# Patient Record
Sex: FEMALE | Race: BLACK OR AFRICAN AMERICAN | URBAN - METROPOLITAN AREA
[De-identification: names, ages, dates, MRNs, and addresses within clinical notes are randomized per-mention and may not be internally consistent; named-entity substitution may affect disease eponyms.]

---

## 2017-09-28 PROBLEM — 414916001 OBESITY: Status: ACTIVE | Noted: 2017-09-28

## 2020-10-02 ENCOUNTER — OFFICE VISIT (OUTPATIENT)
Dept: URBAN - METROPOLITAN AREA CLINIC 27 | Facility: CLINIC | Age: 65
End: 2020-10-02

## 2020-11-03 ENCOUNTER — TELEPHONE ENCOUNTER (OUTPATIENT)
Dept: URBAN - METROPOLITAN AREA CLINIC 96 | Facility: CLINIC | Age: 65
End: 2020-11-03

## 2020-11-03 ENCOUNTER — OFFICE VISIT (OUTPATIENT)
Dept: URBAN - METROPOLITAN AREA CLINIC 96 | Facility: CLINIC | Age: 65
End: 2020-11-03

## 2020-11-03 ENCOUNTER — OFFICE VISIT (OUTPATIENT)
Dept: URBAN - METROPOLITAN AREA TELEHEALTH 2 | Facility: TELEHEALTH | Age: 65
End: 2020-11-03
Payer: MEDICARE

## 2020-11-03 DIAGNOSIS — R10.13 DYSPEPSIA: ICD-10-CM

## 2020-11-03 DIAGNOSIS — K21.9 GERD (GASTROESOPHAGEAL REFLUX DISEASE): ICD-10-CM

## 2020-11-03 DIAGNOSIS — Z91.89 COLON CANCER HIGH RISK: ICD-10-CM

## 2020-11-03 DIAGNOSIS — K63.5 COLON POLYP: ICD-10-CM

## 2020-11-03 PROCEDURE — G8427 DOCREV CUR MEDS BY ELIG CLIN: HCPCS | Performed by: INTERNAL MEDICINE

## 2020-11-03 PROCEDURE — G9903 PT SCRN TBCO ID AS NON USER: HCPCS | Performed by: INTERNAL MEDICINE

## 2020-11-03 PROCEDURE — 1036F TOBACCO NON-USER: CPT | Performed by: INTERNAL MEDICINE

## 2020-11-03 PROCEDURE — 3017F COLORECTAL CA SCREEN DOC REV: CPT | Performed by: INTERNAL MEDICINE

## 2020-11-03 PROCEDURE — G8420 CALC BMI NORM PARAMETERS: HCPCS | Performed by: INTERNAL MEDICINE

## 2020-11-03 PROCEDURE — G9622 NO UNHEAL ETOH USER: HCPCS | Performed by: INTERNAL MEDICINE

## 2020-11-03 PROCEDURE — 99204 OFFICE O/P NEW MOD 45 MIN: CPT | Performed by: INTERNAL MEDICINE

## 2020-11-03 PROCEDURE — G8482 FLU IMMUNIZE ORDER/ADMIN: HCPCS | Performed by: INTERNAL MEDICINE

## 2020-11-03 RX ORDER — SODIUM, POTASSIUM,MAG SULFATES 17.5-3.13G
354ML SOLUTION, RECONSTITUTED, ORAL ORAL
Qty: 354 MILLILITER | Refills: 0 | OUTPATIENT
Start: 2020-11-04 | End: 2020-11-05

## 2020-11-03 RX ORDER — LABETALOL HYDROCHLORIDE 100 MG/1
1 TABLET TABLET ORAL TWICE A DAY
Status: ACTIVE | COMMUNITY

## 2020-11-03 NOTE — HPI-OTHER HISTORIES
Pt here for eval of refractory GERD. . Today on 11/3/2020, pt reports that she has been on Nexium 40mg for 5 years.  Her doctor did not tell her why she has to be on it.  When she attempted to stop/reduce the dose, she has flare of her sxs.  Does not drink much water.  She takes Celebrex daily.  No n/v/f/c.  No abd pain, diarrhea or chest pain.  History of colon polyps, last colonoscopy in 2015. . PMH: HTN, mild copd (not on O2), fibromyalgia SH: quit smoking 2015; no etoh FH: NO GI malignancy

## 2020-11-04 ENCOUNTER — LAB OUTSIDE AN ENCOUNTER (OUTPATIENT)
Dept: URBAN - METROPOLITAN AREA TELEHEALTH 2 | Facility: TELEHEALTH | Age: 65
End: 2020-11-04

## 2020-11-13 ENCOUNTER — OFFICE VISIT (OUTPATIENT)
Dept: URBAN - METROPOLITAN AREA SURGERY CENTER 7 | Facility: SURGERY CENTER | Age: 65
End: 2020-11-13

## 2020-11-17 ENCOUNTER — WEB ENCOUNTER (OUTPATIENT)
Dept: URBAN - METROPOLITAN AREA CLINIC 96 | Facility: CLINIC | Age: 65
End: 2020-11-17

## 2020-11-17 ENCOUNTER — OFFICE VISIT (OUTPATIENT)
Dept: URBAN - METROPOLITAN AREA CLINIC 96 | Facility: CLINIC | Age: 65
End: 2020-11-17
Payer: MEDICARE

## 2020-11-17 DIAGNOSIS — K21.9 GERD (GASTROESOPHAGEAL REFLUX DISEASE): ICD-10-CM

## 2020-11-17 DIAGNOSIS — K63.5 COLON POLYP: ICD-10-CM

## 2020-11-17 DIAGNOSIS — R10.13 DYSPEPSIA: ICD-10-CM

## 2020-11-17 DIAGNOSIS — Z91.89 COLON CANCER HIGH RISK: ICD-10-CM

## 2020-11-17 DIAGNOSIS — R07.89 ATYPICAL CHEST PAIN: ICD-10-CM

## 2020-11-17 PROCEDURE — G8417 CALC BMI ABV UP PARAM F/U: HCPCS | Performed by: INTERNAL MEDICINE

## 2020-11-17 PROCEDURE — G8427 DOCREV CUR MEDS BY ELIG CLIN: HCPCS | Performed by: INTERNAL MEDICINE

## 2020-11-17 PROCEDURE — 3017F COLORECTAL CA SCREEN DOC REV: CPT | Performed by: INTERNAL MEDICINE

## 2020-11-17 PROCEDURE — G9903 PT SCRN TBCO ID AS NON USER: HCPCS | Performed by: INTERNAL MEDICINE

## 2020-11-17 PROCEDURE — G8482 FLU IMMUNIZE ORDER/ADMIN: HCPCS | Performed by: INTERNAL MEDICINE

## 2020-11-17 PROCEDURE — 99214 OFFICE O/P EST MOD 30 MIN: CPT | Performed by: INTERNAL MEDICINE

## 2020-11-17 PROCEDURE — 1036F TOBACCO NON-USER: CPT | Performed by: INTERNAL MEDICINE

## 2020-11-17 PROCEDURE — G9622 NO UNHEAL ETOH USER: HCPCS | Performed by: INTERNAL MEDICINE

## 2020-11-17 RX ORDER — LABETALOL HYDROCHLORIDE 100 MG/1
1 TABLET TABLET ORAL TWICE A DAY
Status: ACTIVE | COMMUNITY

## 2020-11-17 RX ORDER — SODIUM, POTASSIUM,MAG SULFATES 17.5-3.13G
354 ML SOLUTION, RECONSTITUTED, ORAL ORAL
Qty: 354 ML | Refills: 0 | OUTPATIENT
Start: 2020-11-17 | End: 2020-11-18

## 2020-11-17 NOTE — HPI-OTHER HISTORIES
Pt here for eval of refractory GERD. . Previously on 11/3/2020, pt reports that she has been on Nexium 40mg for 5 years.  Her doctor did not tell her why she has to be on it.  When she attempted to stop/reduce the dose, she has flare of her sxs.  Does not drink much water.  She takes Celebrex daily.  No n/v/f/c.  No abd pain, diarrhea or chest pain.  History of colon polyps, last colonoscopy in 2015. . Today on 11/17/2020, pt reports that  her GERD is well controlled on the Nexium; drinks plenty of water.  No NSAID.  Occasaionl chest pain likely reflux. . PMH: HTN, mild copd (not on O2), fibromyalgia; h/o PUD with bleeding in 2015 SH: quit smoking 2015; no etoh FH: NO GI malignancy .

## 2020-11-17 NOTE — PHYSICAL EXAM CONSTITUTIONAL:
well developed, well nourished , in no acute distress , ambulating without difficulty , normal communication ability 2

## 2020-12-17 ENCOUNTER — OFFICE VISIT (OUTPATIENT)
Dept: URBAN - METROPOLITAN AREA SURGERY CENTER 18 | Facility: SURGERY CENTER | Age: 65
End: 2020-12-17

## 2021-02-24 ENCOUNTER — LAB OUTSIDE AN ENCOUNTER (OUTPATIENT)
Dept: URBAN - METROPOLITAN AREA CLINIC 98 | Facility: CLINIC | Age: 66
End: 2021-02-24

## 2021-02-24 ENCOUNTER — OFFICE VISIT (OUTPATIENT)
Dept: URBAN - METROPOLITAN AREA CLINIC 98 | Facility: CLINIC | Age: 66
End: 2021-02-24
Payer: MEDICARE

## 2021-02-24 VITALS
DIASTOLIC BLOOD PRESSURE: 63 MMHG | TEMPERATURE: 96.3 F | WEIGHT: 230.2 LBS | SYSTOLIC BLOOD PRESSURE: 143 MMHG | BODY MASS INDEX: 39.3 KG/M2 | HEART RATE: 53 BPM | HEIGHT: 64 IN

## 2021-02-24 DIAGNOSIS — K21.9 GERD (GASTROESOPHAGEAL REFLUX DISEASE): ICD-10-CM

## 2021-02-24 DIAGNOSIS — R10.13 DYSPEPSIA: ICD-10-CM

## 2021-02-24 DIAGNOSIS — I49.1 ATRIAL PREMATURE DEPOLARIZATION: ICD-10-CM

## 2021-02-24 DIAGNOSIS — K63.5 COLON POLYP: ICD-10-CM

## 2021-02-24 DIAGNOSIS — R07.89 ATYPICAL CHEST PAIN: ICD-10-CM

## 2021-02-24 DIAGNOSIS — Z91.89 COLON CANCER HIGH RISK: ICD-10-CM

## 2021-02-24 PROCEDURE — 99214 OFFICE O/P EST MOD 30 MIN: CPT | Performed by: INTERNAL MEDICINE

## 2021-02-24 RX ORDER — ESOMEPRAZOLE MAGNESIUM 40 MG/1
1 CAPSULE CAPSULE, DELAYED RELEASE ORAL ONCE A DAY
Qty: 30 CAPSULE | Refills: 11 | OUTPATIENT
Start: 2021-02-24

## 2021-02-24 RX ORDER — LABETALOL HYDROCHLORIDE 100 MG/1
1 TABLET TABLET ORAL TWICE A DAY
Status: ACTIVE | COMMUNITY

## 2021-02-24 NOTE — HPI-OTHER HISTORIES
Pt here for eval of refractory GERD. . Previously on 11/3/2020, pt reports that she has been on Nexium 40mg for 5 years.  Her doctor did not tell her why she has to be on it.  When she attempted to stop/reduce the dose, she has flare of her sxs.  Does not drink much water.  She takes Celebrex daily.  No n/v/f/c.  No abd pain, diarrhea or chest pain.  History of colon polyps, last colonoscopy in 2015. . Previously on 11/17/2020, pt reports that  her GERD is well controlled on the Nexium; drinks plenty of water.  No NSAID.  Occasaionl chest pain likely reflux. . Today on 2/24/2021, pt reports that she was hospitalized for afib recently, likely will need ablation.  Is on Elquus.  Reflux is doing well on Nexium.  PMH: HTN, mild copd (not on O2), fibromyalgia; h/o PUD with bleeding in 2015 SH: quit smoking 2015; no etoh FH: NO GI malignancy .

## 2021-03-05 ENCOUNTER — TELEPHONE ENCOUNTER (OUTPATIENT)
Dept: URBAN - METROPOLITAN AREA CLINIC 92 | Facility: CLINIC | Age: 66
End: 2021-03-05

## 2021-03-11 ENCOUNTER — TELEPHONE ENCOUNTER (OUTPATIENT)
Dept: URBAN - METROPOLITAN AREA CLINIC 92 | Facility: CLINIC | Age: 66
End: 2021-03-11

## 2021-03-18 ENCOUNTER — OFFICE VISIT (OUTPATIENT)
Dept: URBAN - METROPOLITAN AREA SURGERY CENTER 18 | Facility: SURGERY CENTER | Age: 66
End: 2021-03-18

## 2021-03-18 ENCOUNTER — TELEPHONE ENCOUNTER (OUTPATIENT)
Dept: URBAN - METROPOLITAN AREA CLINIC 98 | Facility: CLINIC | Age: 66
End: 2021-03-18

## 2021-03-24 ENCOUNTER — OFFICE VISIT (OUTPATIENT)
Dept: URBAN - METROPOLITAN AREA CLINIC 98 | Facility: CLINIC | Age: 66
End: 2021-03-24

## 2021-03-24 ENCOUNTER — OUT OF OFFICE VISIT (OUTPATIENT)
Dept: URBAN - METROPOLITAN AREA MEDICAL CENTER 28 | Facility: MEDICAL CENTER | Age: 66
End: 2021-03-24
Payer: MEDICARE

## 2021-03-24 ENCOUNTER — OFFICE VISIT (OUTPATIENT)
Dept: URBAN - METROPOLITAN AREA MEDICAL CENTER 28 | Facility: MEDICAL CENTER | Age: 66
End: 2021-03-24

## 2021-03-24 DIAGNOSIS — K29.60 ADENOPAPILLOMATOSIS GASTRICA: ICD-10-CM

## 2021-03-24 DIAGNOSIS — Z86.010 H/O ADENOMATOUS POLYP OF COLON: ICD-10-CM

## 2021-03-24 DIAGNOSIS — R12 BURNING REFLUX: ICD-10-CM

## 2021-03-24 DIAGNOSIS — K63.89 BACTERIAL OVERGROWTH SYNDROME: ICD-10-CM

## 2021-03-24 DIAGNOSIS — D12.5 ADENOMA OF SIGMOID COLON: ICD-10-CM

## 2021-03-24 PROCEDURE — 45380 COLONOSCOPY AND BIOPSY: CPT | Performed by: INTERNAL MEDICINE

## 2021-03-24 PROCEDURE — 43239 EGD BIOPSY SINGLE/MULTIPLE: CPT | Performed by: INTERNAL MEDICINE

## 2021-03-24 RX ORDER — LABETALOL HYDROCHLORIDE 100 MG/1
1 TABLET TABLET ORAL TWICE A DAY
Status: ACTIVE | COMMUNITY

## 2021-03-24 RX ORDER — ESOMEPRAZOLE MAGNESIUM 40 MG/1
1 CAPSULE CAPSULE, DELAYED RELEASE ORAL ONCE A DAY
Qty: 30 CAPSULE | Refills: 11 | Status: ACTIVE | COMMUNITY
Start: 2021-02-24

## 2021-04-14 ENCOUNTER — OFFICE VISIT (OUTPATIENT)
Dept: URBAN - METROPOLITAN AREA CLINIC 98 | Facility: CLINIC | Age: 66
End: 2021-04-14

## 2021-04-14 RX ORDER — ESOMEPRAZOLE MAGNESIUM 40 MG/1
1 CAPSULE CAPSULE, DELAYED RELEASE ORAL ONCE A DAY
Qty: 30 CAPSULE | Refills: 11 | Status: ACTIVE | COMMUNITY
Start: 2021-02-24

## 2021-04-14 RX ORDER — LABETALOL HYDROCHLORIDE 100 MG/1
1 TABLET TABLET ORAL TWICE A DAY
Status: ACTIVE | COMMUNITY

## 2021-04-14 NOTE — HPI-OTHER HISTORIES
Pt here for eval of refractory GERD. . Previously on 11/3/2020, pt reports that she has been on Nexium 40mg for 5 years.  Her doctor did not tell her why she has to be on it.  When she attempted to stop/reduce the dose, she has flare of her sxs.  Does not drink much water.  She takes Celebrex daily.  No n/v/f/c.  No abd pain, diarrhea or chest pain.  History of colon polyps, last colonoscopy in 2015. . Previously on 11/17/2020, pt reports that  her GERD is well controlled on the Nexium; drinks plenty of water.  No NSAID.  Occasaionl chest pain likely reflux. . Today on 2/24/2021, pt reports that she was hospitalized for afib recently, likely will need ablation.  Is on Elquus.  Reflux is doing well on Nexium.  PMH: HTN, mild copd (not on O2), fibromyalgia; h/o PUD with bleeding in 2015 SH: quit smoking 2015; no etoh FH: NO GI malignancy .  3/2021: Gastritis on EGD; biopsy showed: Gastric mucosa with inactive gastritis and regenerative changes; HP negative.  Colon polyp in transverse serrated adenoma; sigmoid: tubular adenoma; surveillance due in 3-5 years

## 2021-04-16 ENCOUNTER — OFFICE VISIT (OUTPATIENT)
Dept: URBAN - METROPOLITAN AREA MEDICAL CENTER 28 | Facility: MEDICAL CENTER | Age: 66
End: 2021-04-16

## 2021-05-05 ENCOUNTER — OFFICE VISIT (OUTPATIENT)
Dept: URBAN - METROPOLITAN AREA CLINIC 98 | Facility: CLINIC | Age: 66
End: 2021-05-05
Payer: MEDICARE

## 2021-05-05 VITALS
WEIGHT: 232.2 LBS | TEMPERATURE: 96.9 F | DIASTOLIC BLOOD PRESSURE: 76 MMHG | HEIGHT: 64 IN | HEART RATE: 47 BPM | SYSTOLIC BLOOD PRESSURE: 127 MMHG | BODY MASS INDEX: 39.64 KG/M2

## 2021-05-05 DIAGNOSIS — K63.5 COLON POLYP: ICD-10-CM

## 2021-05-05 DIAGNOSIS — K21.9 GERD (GASTROESOPHAGEAL REFLUX DISEASE): ICD-10-CM

## 2021-05-05 DIAGNOSIS — R07.89 ATYPICAL CHEST PAIN: ICD-10-CM

## 2021-05-05 DIAGNOSIS — I49.1 ATRIAL PREMATURE DEPOLARIZATION: ICD-10-CM

## 2021-05-05 DIAGNOSIS — Z91.89 COLON CANCER HIGH RISK: ICD-10-CM

## 2021-05-05 DIAGNOSIS — R10.13 DYSPEPSIA: ICD-10-CM

## 2021-05-05 PROCEDURE — 99214 OFFICE O/P EST MOD 30 MIN: CPT | Performed by: INTERNAL MEDICINE

## 2021-05-05 RX ORDER — ESOMEPRAZOLE MAGNESIUM 40 MG/1
1 CAPSULE CAPSULE, DELAYED RELEASE ORAL ONCE A DAY
Qty: 90 CAPSULE | Refills: 4 | OUTPATIENT

## 2021-05-05 RX ORDER — LABETALOL HYDROCHLORIDE 100 MG/1
1 TABLET TABLET ORAL TWICE A DAY
Status: ACTIVE | COMMUNITY

## 2021-05-05 RX ORDER — ESOMEPRAZOLE MAGNESIUM 40 MG/1
1 CAPSULE CAPSULE, DELAYED RELEASE ORAL ONCE A DAY
Qty: 30 CAPSULE | Refills: 11 | Status: ACTIVE | COMMUNITY
Start: 2021-02-24
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
## 2021-10-20 ENCOUNTER — OFFICE VISIT (OUTPATIENT)
Dept: URBAN - METROPOLITAN AREA CLINIC 98 | Facility: CLINIC | Age: 66
End: 2021-10-20
Payer: MEDICARE

## 2021-10-20 DIAGNOSIS — K63.5 COLON POLYP: ICD-10-CM

## 2021-10-20 DIAGNOSIS — R10.13 DYSPEPSIA: ICD-10-CM

## 2021-10-20 DIAGNOSIS — Z91.89 COLON CANCER HIGH RISK: ICD-10-CM

## 2021-10-20 DIAGNOSIS — I49.1 ATRIAL PREMATURE DEPOLARIZATION: ICD-10-CM

## 2021-10-20 DIAGNOSIS — R07.89 ATYPICAL CHEST PAIN: ICD-10-CM

## 2021-10-20 DIAGNOSIS — K21.9 GERD (GASTROESOPHAGEAL REFLUX DISEASE): ICD-10-CM

## 2021-10-20 PROCEDURE — 997 AG2 (NON BILLABLE): Performed by: INTERNAL MEDICINE

## 2021-10-20 PROCEDURE — 993 AGA: Performed by: INTERNAL MEDICINE

## 2021-10-20 PROCEDURE — 992 APS NON BILLABLE: Performed by: INTERNAL MEDICINE

## 2021-10-20 RX ORDER — ESOMEPRAZOLE MAGNESIUM 40 MG/1
1 CAPSULE CAPSULE, DELAYED RELEASE ORAL ONCE A DAY
Qty: 90 CAPSULE | Refills: 4 | Status: ACTIVE | COMMUNITY

## 2021-10-20 RX ORDER — LABETALOL HYDROCHLORIDE 100 MG/1
1 TABLET TABLET ORAL TWICE A DAY
Status: ACTIVE | COMMUNITY

## 2021-10-20 RX ORDER — ESOMEPRAZOLE MAGNESIUM 40 MG/1
1 CAPSULE CAPSULE, DELAYED RELEASE ORAL ONCE A DAY
Qty: 90 CAPSULE | Refills: 4 | OUTPATIENT

## 2021-10-20 NOTE — HPI-TODAY'S VISIT:
NO SHOW . Pt here for eval of refractory GERD. . Previously on 11/3/2020, pt reports that she has been on Nexium 40mg for 5 years.  Her doctor did not tell her why she has to be on it.  When she attempted to stop/reduce the dose, she has flare of her sxs.  Does not drink much water.  She takes Celebrex daily.  No n/v/f/c.  No abd pain, diarrhea or chest pain.  History of colon polyps, last colonoscopy in 2015. . Previously on 11/17/2020, pt reports that  her GERD is well controlled on the Nexium; drinks plenty of water.  No NSAID.  Occasaionl chest pain likely reflux. . Previously on 2/24/2021, pt reports that she was hospitalized for afib recently, likely will need ablation.  Is on Elquus.  Reflux is doing well on Nexium. . Today on 5/5/2021, pt reports that she has a pain in rectum since the colonoscopy.  SIgnificant spams present.  PMH: HTN, mild copd (not on O2), fibromyalgia; h/o PUD with bleeding in 2015 SH: quit smoking 2015; no etoh FH: NO GI malignancy .  3/2021: (A) STOMACH, BIOPSY: - GASTRIC MUCOSA WITH MILD CHRONIC INACTIVE GASTRITIS AND REACTIVE/REGENERATIVE CHANGES. - NO HELICOBACTER-LIKE ORGANISMS IDENTIFIED ON [facility] STAIN. (B) TRANSVERSE COLON, POLYP, POLYPECTOMY: - POLYPOID FRAGMENT OF COLONIC MUCOSA WITH SERRATED FEATURES AND FEATURES SUGGESTIVE OF HYPERPLASTIC POLYP. (C) SIGMOID COLON, POLYP, POLYPECTOMY: - TUBULAR ADENOMA. .

## 2021-12-29 ENCOUNTER — OFFICE VISIT (OUTPATIENT)
Dept: URBAN - METROPOLITAN AREA CLINIC 98 | Facility: CLINIC | Age: 66
End: 2021-12-29
Payer: MEDICARE

## 2021-12-29 DIAGNOSIS — K21.9 GERD (GASTROESOPHAGEAL REFLUX DISEASE): ICD-10-CM

## 2021-12-29 DIAGNOSIS — Z91.89 COLON CANCER HIGH RISK: ICD-10-CM

## 2021-12-29 DIAGNOSIS — R10.13 DYSPEPSIA: ICD-10-CM

## 2021-12-29 DIAGNOSIS — K63.5 COLON POLYP: ICD-10-CM

## 2021-12-29 DIAGNOSIS — I49.1 ATRIAL PREMATURE DEPOLARIZATION: ICD-10-CM

## 2021-12-29 PROCEDURE — 99214 OFFICE O/P EST MOD 30 MIN: CPT | Performed by: INTERNAL MEDICINE

## 2021-12-29 RX ORDER — FAMOTIDINE 40 MG/1
1 TAB TABLET, FILM COATED ORAL ONCE A DAY
Qty: 30 TABLET | Refills: 11 | OUTPATIENT
Start: 2021-12-29

## 2021-12-29 RX ORDER — ESOMEPRAZOLE MAGNESIUM 40 MG/1
1 CAPSULE CAPSULE, DELAYED RELEASE ORAL ONCE A DAY
Qty: 90 CAPSULE | Refills: 4 | OUTPATIENT

## 2021-12-29 RX ORDER — LABETALOL HYDROCHLORIDE 100 MG/1
1 TABLET TABLET ORAL TWICE A DAY
Status: ACTIVE | COMMUNITY

## 2021-12-29 RX ORDER — ESOMEPRAZOLE MAGNESIUM 40 MG/1
1 CAPSULE CAPSULE, DELAYED RELEASE ORAL ONCE A DAY
Qty: 90 CAPSULE | Refills: 4 | Status: ACTIVE | COMMUNITY
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
## 2022-04-30 ENCOUNTER — TELEPHONE ENCOUNTER (OUTPATIENT)
Dept: URBAN - METROPOLITAN AREA CLINIC 121 | Facility: CLINIC | Age: 67
End: 2022-04-30

## 2022-04-30 RX ORDER — ESOMEPRAZOLE MAGNESIUM 40 MG
1 CAPSULE PO BID CAPSULE,DELAYED RELEASE (ENTERIC COATED) ORAL
OUTPATIENT
Start: 2015-08-18

## 2022-04-30 RX ORDER — MV,CALCIUM,MIN/IRON/FOLIC ACID 18-0.4-6MG
TABLET ORAL
OUTPATIENT
Start: 2015-08-18

## 2022-04-30 RX ORDER — CITALOPRAM 40 MG/1
TABLET ORAL
OUTPATIENT
Start: 2015-04-02

## 2022-04-30 RX ORDER — BUPROPION HYDROCHLORIDE 200 MG/1
TABLET, FILM COATED ORAL
OUTPATIENT
Start: 2015-04-02

## 2022-04-30 RX ORDER — CITALOPRAM 40 MG/1
TABLET ORAL
OUTPATIENT
Start: 2015-04-02 | End: 2015-08-18

## 2022-05-01 ENCOUNTER — TELEPHONE ENCOUNTER (OUTPATIENT)
Dept: URBAN - METROPOLITAN AREA CLINIC 121 | Facility: CLINIC | Age: 67
End: 2022-05-01

## 2022-05-01 RX ORDER — MONTELUKAST SODIUM 10 MG/1
TABLET, FILM COATED ORAL
Status: ACTIVE | COMMUNITY
Start: 2020-10-02

## 2022-05-01 RX ORDER — OMEPRAZOLE 40 MG/1
TAKE 1 CAPSULE PO BID CAPSULE, DELAYED RELEASE ORAL
Status: ACTIVE | COMMUNITY
Start: 2018-10-11

## 2022-05-01 RX ORDER — ESOMEPRAZOLE MAGNESIUM 40 MG
TAKE 1 CAPSULE BY MOUTH TWICE A DAY CAPSULE,DELAYED RELEASE (ENTERIC COATED) ORAL
Status: ACTIVE | COMMUNITY
Start: 2020-01-15

## 2022-05-01 RX ORDER — TIZANIDINE HYDROCHLORIDE 4 MG/1
TABLET ORAL
Status: ACTIVE | COMMUNITY
Start: 2017-09-28

## 2022-05-01 RX ORDER — BUPROPION HYDROCHLORIDE 200 MG/1
ON HOLD TABLET, FILM COATED ORAL
Status: ACTIVE | COMMUNITY
Start: 2015-04-02

## 2022-05-01 RX ORDER — FLUTICASONE PROPIONATE 50 UG/1
SPRAY, METERED NASAL
Status: ACTIVE | COMMUNITY
Start: 2020-10-02

## 2022-05-01 RX ORDER — BISMUTH SUBSALICYLATE 525 MG/1
1 CAPSULE PO QD TABLET ORAL
Status: ACTIVE | COMMUNITY
Start: 2017-09-28

## 2022-05-01 RX ORDER — MV,CALCIUM,MIN/IRON/FOLIC ACID 18-0.4-6MG
TABLET ORAL
Status: ACTIVE | COMMUNITY
Start: 2015-08-18

## 2022-05-01 RX ORDER — LOSARTAN POTASSIUM 100 MG/1
TABLET, FILM COATED ORAL
Status: ACTIVE | COMMUNITY
Start: 2020-10-02

## 2022-06-08 ENCOUNTER — OFFICE VISIT (OUTPATIENT)
Dept: URBAN - METROPOLITAN AREA CLINIC 96 | Facility: CLINIC | Age: 67
End: 2022-06-08

## 2022-06-08 RX ORDER — MV,CALCIUM,MIN/IRON/FOLIC ACID 18-0.4-6MG
TABLET ORAL
Status: ACTIVE | COMMUNITY
Start: 2015-08-18

## 2022-06-08 RX ORDER — MONTELUKAST SODIUM 10 MG/1
TABLET, FILM COATED ORAL
Status: ACTIVE | COMMUNITY
Start: 2020-10-02

## 2022-06-08 RX ORDER — LOSARTAN POTASSIUM 100 MG/1
TABLET, FILM COATED ORAL
Status: ACTIVE | COMMUNITY
Start: 2020-10-02

## 2022-06-08 RX ORDER — LABETALOL HYDROCHLORIDE 100 MG/1
1 TABLET TABLET ORAL TWICE A DAY
Status: ACTIVE | COMMUNITY

## 2022-06-08 RX ORDER — TIZANIDINE HYDROCHLORIDE 4 MG/1
TABLET ORAL
Status: ACTIVE | COMMUNITY
Start: 2017-09-28

## 2022-06-08 RX ORDER — ESOMEPRAZOLE MAGNESIUM 40 MG
TAKE 1 CAPSULE BY MOUTH TWICE A DAY CAPSULE,DELAYED RELEASE (ENTERIC COATED) ORAL
Status: ACTIVE | COMMUNITY
Start: 2020-01-15

## 2022-06-08 RX ORDER — OMEPRAZOLE 40 MG/1
TAKE 1 CAPSULE PO BID CAPSULE, DELAYED RELEASE ORAL
Status: ACTIVE | COMMUNITY
Start: 2018-10-11

## 2022-06-08 RX ORDER — BUPROPION HYDROCHLORIDE 200 MG/1
ON HOLD TABLET, FILM COATED ORAL
Status: ACTIVE | COMMUNITY
Start: 2015-04-02

## 2022-06-08 RX ORDER — FAMOTIDINE 40 MG/1
1 TAB TABLET, FILM COATED ORAL ONCE A DAY
Qty: 30 TABLET | Refills: 11 | Status: ACTIVE | COMMUNITY
Start: 2021-12-29

## 2022-06-08 RX ORDER — BISMUTH SUBSALICYLATE 525 MG/1
1 CAPSULE PO QD TABLET ORAL
Status: ACTIVE | COMMUNITY
Start: 2017-09-28

## 2022-06-08 RX ORDER — FLUTICASONE PROPIONATE 50 UG/1
SPRAY, METERED NASAL
Status: ACTIVE | COMMUNITY
Start: 2020-10-02

## 2022-06-08 RX ORDER — ESOMEPRAZOLE MAGNESIUM 40 MG/1
1 CAPSULE CAPSULE, DELAYED RELEASE ORAL ONCE A DAY
Qty: 90 CAPSULE | Refills: 4 | Status: ACTIVE | COMMUNITY

## 2022-10-26 ENCOUNTER — OFFICE VISIT (OUTPATIENT)
Dept: URBAN - METROPOLITAN AREA CLINIC 96 | Facility: CLINIC | Age: 67
End: 2022-10-26

## 2022-11-03 ENCOUNTER — OFFICE VISIT (OUTPATIENT)
Dept: URBAN - METROPOLITAN AREA CLINIC 27 | Facility: CLINIC | Age: 67
End: 2022-11-03
Payer: MEDICARE

## 2022-11-03 ENCOUNTER — WEB ENCOUNTER (OUTPATIENT)
Dept: URBAN - METROPOLITAN AREA CLINIC 27 | Facility: CLINIC | Age: 67
End: 2022-11-03

## 2022-11-03 VITALS
BODY MASS INDEX: 37.56 KG/M2 | RESPIRATION RATE: 17 BRPM | HEIGHT: 64 IN | WEIGHT: 220 LBS | SYSTOLIC BLOOD PRESSURE: 134 MMHG | HEART RATE: 66 BPM | DIASTOLIC BLOOD PRESSURE: 70 MMHG

## 2022-11-03 DIAGNOSIS — K92.1 MELENA: ICD-10-CM

## 2022-11-03 DIAGNOSIS — E66.01 MORBID (SEVERE) OBESITY DUE TO EXCESS CALORIES: ICD-10-CM

## 2022-11-03 DIAGNOSIS — R93.5 ABNORMAL ABDOMINAL CT SCAN: ICD-10-CM

## 2022-11-03 DIAGNOSIS — R10.13 DYSPEPSIA: ICD-10-CM

## 2022-11-03 PROCEDURE — 99244 OFF/OP CNSLTJ NEW/EST MOD 40: CPT | Performed by: INTERNAL MEDICINE

## 2022-11-03 PROCEDURE — 99204 OFFICE O/P NEW MOD 45 MIN: CPT | Performed by: INTERNAL MEDICINE

## 2022-11-03 RX ORDER — ESOMEPRAZOLE MAGNESIUM 40 MG/1
1 CAPSULE CAPSULE, DELAYED RELEASE ORAL ONCE A DAY
Qty: 90 CAPSULE | Refills: 4 | Status: ACTIVE | COMMUNITY

## 2022-11-03 RX ORDER — MV,CALCIUM,MIN/IRON/FOLIC ACID 18-0.4-6MG
TABLET ORAL
Status: ACTIVE | COMMUNITY
Start: 2015-08-18

## 2022-11-03 RX ORDER — FLUTICASONE PROPIONATE 50 UG/1
SPRAY, METERED NASAL
Status: ACTIVE | COMMUNITY
Start: 2020-10-02

## 2022-11-03 RX ORDER — TIZANIDINE HYDROCHLORIDE 4 MG/1
TABLET ORAL
Status: ACTIVE | COMMUNITY
Start: 2017-09-28

## 2022-11-03 RX ORDER — LABETALOL HYDROCHLORIDE 100 MG/1
1 TABLET TABLET ORAL TWICE A DAY
Status: ACTIVE | COMMUNITY

## 2022-11-03 RX ORDER — ESOMEPRAZOLE MAGNESIUM 40 MG
TAKE 1 CAPSULE BY MOUTH TWICE A DAY CAPSULE,DELAYED RELEASE (ENTERIC COATED) ORAL
Status: ACTIVE | COMMUNITY
Start: 2020-01-15

## 2022-11-03 RX ORDER — BISMUTH SUBSALICYLATE 525 MG/1
1 CAPSULE PO QD TABLET ORAL
Status: ACTIVE | COMMUNITY
Start: 2017-09-28

## 2022-11-03 RX ORDER — MONTELUKAST SODIUM 10 MG/1
TABLET, FILM COATED ORAL
Status: ACTIVE | COMMUNITY
Start: 2020-10-02

## 2022-11-03 RX ORDER — LOSARTAN POTASSIUM 100 MG/1
TABLET, FILM COATED ORAL
Status: ACTIVE | COMMUNITY
Start: 2020-10-02

## 2022-11-03 RX ORDER — BUPROPION HYDROCHLORIDE 200 MG/1
ON HOLD TABLET, FILM COATED ORAL
Status: ACTIVE | COMMUNITY
Start: 2015-04-02

## 2022-11-03 RX ORDER — FAMOTIDINE 40 MG/1
1 TAB TABLET, FILM COATED ORAL ONCE A DAY
Qty: 30 TABLET | Refills: 11 | Status: ACTIVE | COMMUNITY
Start: 2021-12-29

## 2022-11-03 RX ORDER — OMEPRAZOLE 40 MG/1
TAKE 1 CAPSULE PO BID CAPSULE, DELAYED RELEASE ORAL
Status: ACTIVE | COMMUNITY
Start: 2018-10-11

## 2022-11-03 NOTE — HPI-TODAY'S VISIT:
This is a 67-year-old female seen as a new patient in consultation today.  She was seen in the emergency room in September with COVID and abdominal pain.  She had a CT scan that showed a hiatal hernia and uterine fibroids.  She was having some black stool at the time but was taking Pepto-Bismol.  She was given Nexium and that helps a little bit.  She continues with intermittent abdominal symptoms and is concerned about the symptoms and the CT scan.  CT scan also raised the question of a nodule in the distal small bowel.  Given the history of melena at the time the question was raised as to whether this was a bleeding focus.  However she did not have any further overt bleeding.  She was seen by previous gastroenterologist and underwent a colonoscopy in March 2021 and a tubular adenoma was removed.  She is concerned because of continued intermittent abdominal symptoms

## 2022-12-14 ENCOUNTER — OFFICE VISIT (OUTPATIENT)
Dept: URBAN - METROPOLITAN AREA CLINIC 27 | Facility: CLINIC | Age: 67
End: 2022-12-14
Payer: MEDICARE

## 2022-12-14 ENCOUNTER — LAB OUTSIDE AN ENCOUNTER (OUTPATIENT)
Dept: URBAN - METROPOLITAN AREA CLINIC 27 | Facility: CLINIC | Age: 67
End: 2022-12-14

## 2022-12-14 VITALS
BODY MASS INDEX: 37.56 KG/M2 | WEIGHT: 220 LBS | HEIGHT: 64 IN | DIASTOLIC BLOOD PRESSURE: 69 MMHG | SYSTOLIC BLOOD PRESSURE: 151 MMHG | HEART RATE: 73 BPM | RESPIRATION RATE: 17 BRPM

## 2022-12-14 DIAGNOSIS — R93.5 ABNORMAL ABDOMINAL CT SCAN: ICD-10-CM

## 2022-12-14 DIAGNOSIS — R10.11 RIGHT UPPER QUADRANT ABDOMINAL PAIN: ICD-10-CM

## 2022-12-14 DIAGNOSIS — I49.1 ATRIAL PREMATURE DEPOLARIZATION: ICD-10-CM

## 2022-12-14 DIAGNOSIS — R10.13 DYSPEPSIA: ICD-10-CM

## 2022-12-14 DIAGNOSIS — E66.01 MORBID (SEVERE) OBESITY DUE TO EXCESS CALORIES: ICD-10-CM

## 2022-12-14 DIAGNOSIS — K21.9 GERD (GASTROESOPHAGEAL REFLUX DISEASE): ICD-10-CM

## 2022-12-14 DIAGNOSIS — K92.1 MELENA: ICD-10-CM

## 2022-12-14 PROCEDURE — 99214 OFFICE O/P EST MOD 30 MIN: CPT | Performed by: PHYSICIAN ASSISTANT

## 2022-12-14 RX ORDER — ESOMEPRAZOLE MAGNESIUM 40 MG/1
1 CAPSULE CAPSULE, DELAYED RELEASE ORAL ONCE A DAY
Qty: 90 CAPSULE | Refills: 4 | Status: ACTIVE | COMMUNITY

## 2022-12-14 RX ORDER — MV,CALCIUM,MIN/IRON/FOLIC ACID 18-0.4-6MG
TABLET ORAL
Status: ACTIVE | COMMUNITY
Start: 2015-08-18

## 2022-12-14 RX ORDER — TIZANIDINE HYDROCHLORIDE 4 MG/1
TABLET ORAL
Status: ACTIVE | COMMUNITY
Start: 2017-09-28

## 2022-12-14 RX ORDER — OMEPRAZOLE 40 MG/1
TAKE 1 CAPSULE PO BID CAPSULE, DELAYED RELEASE ORAL
Status: ACTIVE | COMMUNITY
Start: 2018-10-11

## 2022-12-14 RX ORDER — LOSARTAN POTASSIUM 100 MG/1
TABLET, FILM COATED ORAL
Status: ACTIVE | COMMUNITY
Start: 2020-10-02

## 2022-12-14 RX ORDER — FLUTICASONE PROPIONATE 50 UG/1
SPRAY, METERED NASAL
Status: ACTIVE | COMMUNITY
Start: 2020-10-02

## 2022-12-14 RX ORDER — BUPROPION HYDROCHLORIDE 200 MG/1
ON HOLD TABLET, FILM COATED ORAL
Status: ACTIVE | COMMUNITY
Start: 2015-04-02

## 2022-12-14 RX ORDER — LABETALOL HYDROCHLORIDE 100 MG/1
1 TABLET TABLET ORAL TWICE A DAY
Status: ACTIVE | COMMUNITY

## 2022-12-14 RX ORDER — FAMOTIDINE 40 MG/1
1 TAB TABLET, FILM COATED ORAL ONCE A DAY
Qty: 30 TABLET | Refills: 11 | Status: ACTIVE | COMMUNITY
Start: 2021-12-29

## 2022-12-14 RX ORDER — ESOMEPRAZOLE MAGNESIUM 40 MG
TAKE 1 CAPSULE BY MOUTH TWICE A DAY CAPSULE,DELAYED RELEASE (ENTERIC COATED) ORAL
Status: ACTIVE | COMMUNITY
Start: 2020-01-15

## 2022-12-14 RX ORDER — BISMUTH SUBSALICYLATE 525 MG/1
1 CAPSULE PO QD TABLET ORAL
Status: ACTIVE | COMMUNITY
Start: 2017-09-28

## 2022-12-14 RX ORDER — MONTELUKAST SODIUM 10 MG/1
TABLET, FILM COATED ORAL
Status: ACTIVE | COMMUNITY
Start: 2020-10-02

## 2022-12-14 NOTE — HPI-TODAY'S VISIT:
Ms. Pinozn is a 67-year-old female patient of Dr. Erazo presenting for evaluation of abdominal pain. After visit last month she was scheduled for UGI/SBFT but cancelled. She went to [place] ED on 12/8/22 after waking up with sharp, R sided abdominal pain. Labs were unremarkable. Stone protocol CT showed large pedunculated uterine fibroid vs. ovarian lesion. She saw GYN yesterday and is scheduled for pelvic US tmrw. Her R sided abdominal pain has improved but not resolved. She also c/o "sour stomach," belching, intermittent epigastric discomfort. No N/V, heartburn, dysphagia. No overt triggers for her pain. She has resumed Nexium QD which is not helping; Pepto, Pepcid also not helpful. Bowels are regular, no melena or hematochezia. New meds include Vitamin D supplement started a few wks ago. No prior abdominal surgeries; she still has her GB. No regular NSAID use. . Colonoscopy March 2021, [facility]:3 small polyps; recall 3 to 5 years EGD March 2021, [facility]:Gastritis, biopsies unremarkable FH:Sister with colon polyps . 11/3/22: This is a 67-year-old female seen as a new patient in consultation today.  She was seen in the emergency room in September with COVID and abdominal pain.  She had a CT scan that showed a hiatal hernia and uterine fibroids.  She was having some black stool at the time but was taking Pepto-Bismol.  She was given Nexium and that helps a little bit.  She continues with intermittent abdominal symptoms and is concerned about the symptoms and the CT scan.  CT scan also raised the question of a nodule in the distal small bowel.  Given the history of melena at the time the question was raised as to whether this was a bleeding focus.  However she did not have any further overt bleeding.  She was seen by previous gastroenterologist and underwent a colonoscopy in March 2021 and a tubular adenoma was removed.  She is concerned because of continued intermittent abdominal symptoms

## 2022-12-22 ENCOUNTER — OFFICE VISIT (OUTPATIENT)
Dept: URBAN - METROPOLITAN AREA CLINIC 27 | Facility: CLINIC | Age: 67
End: 2022-12-22

## 2023-01-05 ENCOUNTER — TELEPHONE ENCOUNTER (OUTPATIENT)
Dept: URBAN - METROPOLITAN AREA CLINIC 27 | Facility: CLINIC | Age: 68
End: 2023-01-05

## 2023-01-11 ENCOUNTER — OFFICE VISIT (OUTPATIENT)
Dept: URBAN - METROPOLITAN AREA CLINIC 27 | Facility: CLINIC | Age: 68
End: 2023-01-11
Payer: MEDICARE

## 2023-01-11 VITALS
HEIGHT: 64 IN | HEART RATE: 72 BPM | DIASTOLIC BLOOD PRESSURE: 61 MMHG | WEIGHT: 220 LBS | BODY MASS INDEX: 37.56 KG/M2 | RESPIRATION RATE: 17 BRPM | SYSTOLIC BLOOD PRESSURE: 126 MMHG

## 2023-01-11 DIAGNOSIS — R10.13 DYSPEPSIA: ICD-10-CM

## 2023-01-11 DIAGNOSIS — K21.9 GERD (GASTROESOPHAGEAL REFLUX DISEASE): ICD-10-CM

## 2023-01-11 DIAGNOSIS — R10.11 RIGHT UPPER QUADRANT ABDOMINAL PAIN: ICD-10-CM

## 2023-01-11 DIAGNOSIS — K80.20 GALLSTONES: ICD-10-CM

## 2023-01-11 DIAGNOSIS — E66.01 MORBID (SEVERE) OBESITY DUE TO EXCESS CALORIES: ICD-10-CM

## 2023-01-11 PROBLEM — 238136002 MORBID OBESITY: Status: ACTIVE | Noted: 2020-10-02

## 2023-01-11 PROBLEM — 162031009 DYSPEPSIA: Status: ACTIVE | Noted: 2022-12-14

## 2023-01-11 PROCEDURE — 99214 OFFICE O/P EST MOD 30 MIN: CPT | Performed by: PHYSICIAN ASSISTANT

## 2023-01-11 RX ORDER — OMEPRAZOLE 40 MG/1
TAKE 1 CAPSULE PO BID CAPSULE, DELAYED RELEASE ORAL
Status: ACTIVE | COMMUNITY
Start: 2018-10-11

## 2023-01-11 RX ORDER — MV,CALCIUM,MIN/IRON/FOLIC ACID 18-0.4-6MG
TABLET ORAL
Status: ACTIVE | COMMUNITY
Start: 2015-08-18

## 2023-01-11 RX ORDER — ESOMEPRAZOLE MAGNESIUM 40 MG/1
1 CAPSULE CAPSULE, DELAYED RELEASE ORAL ONCE A DAY
Qty: 90 CAPSULE | Refills: 4 | Status: ACTIVE | COMMUNITY

## 2023-01-11 RX ORDER — ESOMEPRAZOLE MAGNESIUM 40 MG
TAKE 1 CAPSULE BY MOUTH TWICE A DAY CAPSULE,DELAYED RELEASE (ENTERIC COATED) ORAL
Status: ACTIVE | COMMUNITY
Start: 2020-01-15

## 2023-01-11 RX ORDER — LABETALOL HYDROCHLORIDE 100 MG/1
1 TABLET TABLET ORAL TWICE A DAY
Status: ACTIVE | COMMUNITY

## 2023-01-11 RX ORDER — MONTELUKAST SODIUM 10 MG/1
TABLET, FILM COATED ORAL
Status: ACTIVE | COMMUNITY
Start: 2020-10-02

## 2023-01-11 RX ORDER — BISMUTH SUBSALICYLATE 525 MG/1
1 CAPSULE PO QD TABLET ORAL
Status: ACTIVE | COMMUNITY
Start: 2017-09-28

## 2023-01-11 RX ORDER — LOSARTAN POTASSIUM 100 MG/1
TABLET, FILM COATED ORAL
Status: ACTIVE | COMMUNITY
Start: 2020-10-02

## 2023-01-11 RX ORDER — TIZANIDINE HYDROCHLORIDE 4 MG/1
TABLET ORAL
Status: ACTIVE | COMMUNITY
Start: 2017-09-28

## 2023-01-11 RX ORDER — FLUTICASONE PROPIONATE 50 UG/1
SPRAY, METERED NASAL
Status: ACTIVE | COMMUNITY
Start: 2020-10-02

## 2023-01-11 RX ORDER — FAMOTIDINE 40 MG/1
1 TAB TABLET, FILM COATED ORAL ONCE A DAY
Qty: 30 TABLET | Refills: 11 | Status: ACTIVE | COMMUNITY
Start: 2021-12-29

## 2023-01-11 RX ORDER — BUPROPION HYDROCHLORIDE 200 MG/1
ON HOLD TABLET, FILM COATED ORAL
Status: ACTIVE | COMMUNITY
Start: 2015-04-02

## 2023-01-11 NOTE — HPI-TODAY'S VISIT:
1/11/23 Ms. Pinzon is a 67 YOF presenting for follow up of indigestion and abdominal discomfort. She feels well, has no more indigestions after she decreased the fiber in her diet. She has rare episodes of RUQ discomfort, not bothersome now. Her US showed numerous gallstones/sludge with thickened GB wall. She has had no N/V, change in bowels. She is seeing GYN for her abnormal pelvic US, says she may need biopsy. . Abd US 12/15/22: numerous gallstones and/or sludge, thickened GB wall (5mm); consider HIDA Pelvis US 12/15/22: thickened, heterogeneous endometrium, consider sampling; ?exophytic L fibroid . 12/14/22: Ms. Pinzon is a 67-year-old female patient of Dr. Erazo presenting for evaluation of abdominal pain. After visit last month she was scheduled for UGI/SBFT but cancelled. She went to [place] ED on 12/8/22 after waking up with sharp, R sided abdominal pain. Labs were unremarkable. Stone protocol CT showed large pedunculated uterine fibroid vs. ovarian lesion. She saw GYN yesterday and is scheduled for pelvic US tmrw. Her R sided abdominal pain has improved but not resolved. She also c/o "sour stomach," belching, intermittent epigastric discomfort. No N/V, heartburn, dysphagia. No overt triggers for her pain. She has resumed Nexium QD which is not helping; Pepto, Pepcid also not helpful. Bowels are regular, no melena or hematochezia. New meds include Vitamin D supplement started a few wks ago. No prior abdominal surgeries; she still has her GB. No regular NSAID use. . 11/3/22: This is a 67-year-old female seen as a new patient in consultation today.  She was seen in the emergency room in September with COVID and abdominal pain.  She had a CT scan that showed a hiatal hernia and uterine fibroids.  She was having some black stool at the time but was taking Pepto-Bismol.  She was given Nexium and that helps a little bit.  She continues with intermittent abdominal symptoms and is concerned about the symptoms and the CT scan.  CT scan also raised the question of a nodule in the distal small bowel.  Given the history of melena at the time the question was raised as to whether this was a bleeding focus.  However she did not have any further overt bleeding.  She was seen by previous gastroenterologist and underwent a colonoscopy in March 2021 and a tubular adenoma was removed.  She is concerned because of continued intermittent abdominal symptoms . Colonoscopy March 2021, [facility]:3 small polyps; recall 3 to 5 years EGD March 2021, [facility]:Gastritis, biopsies unremarkable FH:Sister with colon polyps

## 2023-01-11 NOTE — PHYSICAL EXAM GASTROINTESTINAL
Abdomen , soft, markedly TTP in lateral RUQ, nondistended , no guarding or rigidity , no masses palpable , normal bowel sounds , Liver and Spleen , no hepatomegaly present , no hepatosplenomegaly , liver nontender , spleen not palpable

## 2023-01-18 ENCOUNTER — OFFICE VISIT (OUTPATIENT)
Dept: URBAN - METROPOLITAN AREA CLINIC 27 | Facility: CLINIC | Age: 68
End: 2023-01-18

## 2023-09-11 ENCOUNTER — TELEPHONE ENCOUNTER (OUTPATIENT)
Dept: URBAN - METROPOLITAN AREA CLINIC 27 | Facility: CLINIC | Age: 68
End: 2023-09-11

## 2023-09-11 RX ORDER — ESOMEPRAZOLE MAGNESIUM 40 MG/1
1 CAPSULE CAPSULE, DELAYED RELEASE ORAL ONCE A DAY
Qty: 90 CAPSULE | Refills: 4

## 2023-09-13 ENCOUNTER — OFFICE VISIT (OUTPATIENT)
Dept: URBAN - METROPOLITAN AREA CLINIC 27 | Facility: CLINIC | Age: 68
End: 2023-09-13

## 2023-09-19 ENCOUNTER — OFFICE VISIT (OUTPATIENT)
Dept: URBAN - METROPOLITAN AREA CLINIC 27 | Facility: CLINIC | Age: 68
End: 2023-09-19

## 2023-09-19 RX ORDER — TIZANIDINE HYDROCHLORIDE 4 MG/1
TABLET ORAL
Status: ACTIVE | COMMUNITY
Start: 2017-09-28

## 2023-09-19 RX ORDER — LOSARTAN POTASSIUM 100 MG/1
TABLET, FILM COATED ORAL
Status: ACTIVE | COMMUNITY
Start: 2020-10-02

## 2023-09-19 RX ORDER — ESOMEPRAZOLE MAGNESIUM 40 MG/1
1 CAPSULE CAPSULE, DELAYED RELEASE ORAL ONCE A DAY
Qty: 90 CAPSULE | Refills: 4 | Status: ACTIVE | COMMUNITY

## 2023-09-19 RX ORDER — LABETALOL HYDROCHLORIDE 100 MG/1
1 TABLET TABLET ORAL TWICE A DAY
Status: ACTIVE | COMMUNITY

## 2023-09-19 RX ORDER — BISMUTH SUBSALICYLATE 525 MG/1
1 CAPSULE PO QD TABLET ORAL
Status: ACTIVE | COMMUNITY
Start: 2017-09-28

## 2023-09-19 RX ORDER — MV,CALCIUM,MIN/IRON/FOLIC ACID 18-0.4-6MG
TABLET ORAL
Status: ACTIVE | COMMUNITY
Start: 2015-08-18

## 2023-09-19 RX ORDER — ESOMEPRAZOLE MAGNESIUM 40 MG
TAKE 1 CAPSULE BY MOUTH TWICE A DAY CAPSULE,DELAYED RELEASE (ENTERIC COATED) ORAL
Status: ACTIVE | COMMUNITY
Start: 2020-01-15

## 2023-09-19 RX ORDER — FAMOTIDINE 40 MG/1
1 TAB TABLET, FILM COATED ORAL ONCE A DAY
Qty: 30 TABLET | Refills: 11 | Status: ACTIVE | COMMUNITY
Start: 2021-12-29

## 2023-09-19 RX ORDER — OMEPRAZOLE 40 MG/1
TAKE 1 CAPSULE PO BID CAPSULE, DELAYED RELEASE ORAL
Status: ACTIVE | COMMUNITY
Start: 2018-10-11

## 2023-09-19 RX ORDER — BUPROPION HYDROCHLORIDE 200 MG/1
ON HOLD TABLET, FILM COATED ORAL
Status: ACTIVE | COMMUNITY
Start: 2015-04-02

## 2023-09-19 RX ORDER — MONTELUKAST SODIUM 10 MG/1
TABLET, FILM COATED ORAL
Status: ACTIVE | COMMUNITY
Start: 2020-10-02

## 2023-09-19 RX ORDER — FLUTICASONE PROPIONATE 50 UG/1
SPRAY, METERED NASAL
Status: ACTIVE | COMMUNITY
Start: 2020-10-02

## 2023-09-19 NOTE — HPI-ZZZTODAY'S VISIT
Ms. Piznon is a 68-year-old female patient of Dr. Erazo presenting for follow-up of abdominal pain.  She has been seen several times over the last year with this issue.  At last visit in January she was feeling better after she had decreased dietary fiber.  She was only having rare episodes of RUQ discomfort, no nausea or vomiting.  She had seen GYN for her abnormal pelvic US.  She was referred to general surgery for consideration of cholecystectomy. . EGD March 2021, [facility]:Gastritis, biopsies unremarkable Colonoscopy March 2021, [facility]:3 small polyps; recall 3 to 5 years Abdominal US December 2022:Numerous gallstones and/or sludge, thickened gallbladder wall (5 mm); consider HIDA Pelvic US December 2022:Thickened heterogeneous endometrium, consider sampling; possible exophytic left fibroid

## 2024-01-29 ENCOUNTER — OFFICE VISIT (OUTPATIENT)
Dept: URBAN - METROPOLITAN AREA CLINIC 27 | Facility: CLINIC | Age: 69
End: 2024-01-29
Payer: MEDICARE

## 2024-01-29 VITALS
WEIGHT: 220 LBS | DIASTOLIC BLOOD PRESSURE: 58 MMHG | HEART RATE: 72 BPM | SYSTOLIC BLOOD PRESSURE: 138 MMHG | BODY MASS INDEX: 37.56 KG/M2 | HEIGHT: 64 IN | RESPIRATION RATE: 17 BRPM

## 2024-01-29 DIAGNOSIS — Z86.010 PERSONAL HISTORY OF COLONIC POLYPS: ICD-10-CM

## 2024-01-29 DIAGNOSIS — K21.9 ACID REFLUX: ICD-10-CM

## 2024-01-29 DIAGNOSIS — Z79.82 LONG TERM (CURRENT) USE OF ASPIRIN: ICD-10-CM

## 2024-01-29 DIAGNOSIS — K80.20 GALLSTONES: ICD-10-CM

## 2024-01-29 PROCEDURE — 99214 OFFICE O/P EST MOD 30 MIN: CPT | Performed by: PHYSICIAN ASSISTANT

## 2024-01-29 RX ORDER — ESOMEPRAZOLE MAGNESIUM 40 MG/1
1 CAPSULE CAPSULE, DELAYED RELEASE ORAL ONCE A DAY
Qty: 90 CAPSULE | Refills: 4 | Status: ACTIVE | COMMUNITY

## 2024-01-29 RX ORDER — FAMOTIDINE 40 MG/1
1 TAB TABLET, FILM COATED ORAL ONCE A DAY
Qty: 30 TABLET | Refills: 11 | Status: ACTIVE | COMMUNITY
Start: 2021-12-29

## 2024-01-29 RX ORDER — BISMUTH SUBSALICYLATE 525 MG/1
1 CAPSULE PO QD TABLET ORAL
Status: ACTIVE | COMMUNITY
Start: 2017-09-28

## 2024-01-29 RX ORDER — BUPROPION HYDROCHLORIDE 200 MG/1
ON HOLD TABLET, FILM COATED ORAL
Status: ACTIVE | COMMUNITY
Start: 2015-04-02

## 2024-01-29 RX ORDER — MV,CALCIUM,MIN/IRON/FOLIC ACID 18-0.4-6MG
TABLET ORAL
Status: ACTIVE | COMMUNITY
Start: 2015-08-18

## 2024-01-29 RX ORDER — TIZANIDINE HYDROCHLORIDE 4 MG/1
TABLET ORAL
Status: ACTIVE | COMMUNITY
Start: 2017-09-28

## 2024-01-29 RX ORDER — FLUTICASONE PROPIONATE 50 UG/1
SPRAY, METERED NASAL
Status: ACTIVE | COMMUNITY
Start: 2020-10-02

## 2024-01-29 RX ORDER — LABETALOL HYDROCHLORIDE 100 MG/1
1 TABLET TABLET ORAL TWICE A DAY
Status: ACTIVE | COMMUNITY

## 2024-01-29 RX ORDER — OMEPRAZOLE 40 MG/1
TAKE 1 CAPSULE PO BID CAPSULE, DELAYED RELEASE ORAL
Status: ACTIVE | COMMUNITY
Start: 2018-10-11

## 2024-01-29 RX ORDER — MONTELUKAST SODIUM 10 MG/1
TABLET, FILM COATED ORAL
Status: ACTIVE | COMMUNITY
Start: 2020-10-02

## 2024-01-29 RX ORDER — ESOMEPRAZOLE MAGNESIUM 40 MG
TAKE 1 CAPSULE BY MOUTH TWICE A DAY CAPSULE,DELAYED RELEASE (ENTERIC COATED) ORAL
Status: ACTIVE | COMMUNITY
Start: 2020-01-15

## 2024-01-29 RX ORDER — LOSARTAN POTASSIUM 100 MG/1
TABLET, FILM COATED ORAL
Status: ACTIVE | COMMUNITY
Start: 2020-10-02

## 2024-01-29 NOTE — HPI-ZZZTODAY'S VISIT
Ms. Pinzon is a 68-year-old female patient of Dr. Erazo presenting for follow-up of reflux and abdominal pain.  She has had no further episodes of RUQ pain. Abd US showed gallstones/sludge and thickened GB wall; she was referred to general surgery, saw them and was told she did not need jak unless she became symptomatic again. Recently she has been having nighttime coughing. She had laryngoscopy with Dr. Burrell which showed moderate reflux changes and was advised to follow up with Dr. Erazo. She denies overt heartburn, water brash, dysphagia. She was taking Nexium QD but has been cutting back, is concerned about long term PPI use. She also uses nighttime Pepcid prn. She does not think either of these meds help with her coughing. . EGD March 2021, [facility]: Gastritis, biopsies unremarkable Colonoscopy March 2021, [facility]: 3 small polyps; recall 3 to 5 years Abdominal US December 2022:Numerous gallstones and/or sludge, thickened gallbladder wall (5 mm); consider HIDA Pelvic US December 2022:Thickened heterogeneous endometrium, consider sampling; possible exophytic left fibroid a

## 2024-01-29 NOTE — PHYSICAL EXAM GASTROINTESTINAL
Abdomen , soft, epigastric and RUQ TTP, nondistended , no guarding or rigidity , no masses palpable , normal bowel sounds , Liver and Spleen , no hepatomegaly present , no hepatosplenomegaly , liver nontender , spleen not palpable

## 2024-01-30 ENCOUNTER — DASHBOARD ENCOUNTERS (OUTPATIENT)
Age: 69
End: 2024-01-30

## 2024-01-30 PROBLEM — 428283002: Status: ACTIVE | Noted: 2024-01-30

## 2024-01-30 PROBLEM — 235919008: Status: ACTIVE | Noted: 2023-01-11

## 2024-01-30 PROBLEM — 131531000119103: Status: ACTIVE | Noted: 2024-01-30

## 2024-01-30 PROBLEM — 235595009 GASTROESOPHAGEAL REFLUX DISEASE: Status: ACTIVE | Noted: 2020-11-04

## 2024-03-19 ENCOUNTER — OTHER (OUTPATIENT)
Dept: URBAN - METROPOLITAN AREA MEDICAL CENTER 8 | Facility: MEDICAL CENTER | Age: 69
End: 2024-03-19

## 2024-04-05 ENCOUNTER — OV EP (OUTPATIENT)
Dept: URBAN - METROPOLITAN AREA CLINIC 27 | Facility: CLINIC | Age: 69
End: 2024-04-05

## 2024-06-11 ENCOUNTER — TELEPHONE ENCOUNTER (OUTPATIENT)
Dept: URBAN - METROPOLITAN AREA CLINIC 13 | Facility: CLINIC | Age: 69
End: 2024-06-11

## 2024-06-14 ENCOUNTER — CLAIMS CREATED FROM THE CLAIM WINDOW (OUTPATIENT)
Dept: URBAN - METROPOLITAN AREA CLINIC 4 | Facility: CLINIC | Age: 69
End: 2024-06-14
Payer: MEDICARE

## 2024-06-14 ENCOUNTER — OUT OF OFFICE VISIT (OUTPATIENT)
Dept: URBAN - METROPOLITAN AREA SURGERY CENTER 7 | Facility: SURGERY CENTER | Age: 69
End: 2024-06-14
Payer: MEDICARE

## 2024-06-14 DIAGNOSIS — D12.5 ADENOMATOUS POLYP OF SIGMOID COLON: ICD-10-CM

## 2024-06-14 DIAGNOSIS — Z86.010 ADENOMAS PERSONAL HISTORY OF COLONIC POLYPS: ICD-10-CM

## 2024-06-14 DIAGNOSIS — Z12.11 COLON CANCER SCREENING (HIGH RISK): ICD-10-CM

## 2024-06-14 DIAGNOSIS — K63.5 POLYP OF COLON: ICD-10-CM

## 2024-06-14 DIAGNOSIS — Z86.010 PERSONAL HISTORY OF COLON POLYPS: ICD-10-CM

## 2024-06-14 DIAGNOSIS — K63.5 BENIGN COLON POLYP: ICD-10-CM

## 2024-06-14 DIAGNOSIS — K57.30 DIVERTICULA OF COLON: ICD-10-CM

## 2024-06-14 PROCEDURE — 00811 ANES LWR INTST NDSC NOS: CPT | Performed by: NURSE ANESTHETIST, CERTIFIED REGISTERED

## 2024-06-14 PROCEDURE — 45380 COLONOSCOPY AND BIOPSY: CPT | Performed by: INTERNAL MEDICINE

## 2024-06-14 PROCEDURE — 45380 COLONOSCOPY AND BIOPSY: CPT | Performed by: CLINIC/CENTER

## 2024-06-14 PROCEDURE — 88305 TISSUE EXAM BY PATHOLOGIST: CPT | Performed by: PATHOLOGY

## 2024-06-14 RX ORDER — LOSARTAN POTASSIUM 100 MG/1
TABLET, FILM COATED ORAL
Status: ACTIVE | COMMUNITY
Start: 2020-10-02

## 2024-06-14 RX ORDER — TIZANIDINE HYDROCHLORIDE 4 MG/1
TABLET ORAL
Status: ACTIVE | COMMUNITY
Start: 2017-09-28

## 2024-06-14 RX ORDER — FLUTICASONE PROPIONATE 50 UG/1
SPRAY, METERED NASAL
Status: ACTIVE | COMMUNITY
Start: 2020-10-02

## 2024-06-14 RX ORDER — MV,CALCIUM,MIN/IRON/FOLIC ACID 18-0.4-6MG
TABLET ORAL
Status: ACTIVE | COMMUNITY
Start: 2015-08-18

## 2024-06-14 RX ORDER — LABETALOL HYDROCHLORIDE 100 MG/1
1 TABLET TABLET ORAL TWICE A DAY
Status: ACTIVE | COMMUNITY

## 2024-06-14 RX ORDER — OMEPRAZOLE 40 MG/1
TAKE 1 CAPSULE PO BID CAPSULE, DELAYED RELEASE ORAL
Status: ACTIVE | COMMUNITY
Start: 2018-10-11

## 2024-06-14 RX ORDER — BUPROPION HYDROCHLORIDE 200 MG/1
ON HOLD TABLET, FILM COATED ORAL
Status: ACTIVE | COMMUNITY
Start: 2015-04-02

## 2024-06-14 RX ORDER — MONTELUKAST SODIUM 10 MG/1
TABLET, FILM COATED ORAL
Status: ACTIVE | COMMUNITY
Start: 2020-10-02

## 2024-06-14 RX ORDER — BISMUTH SUBSALICYLATE 525 MG/1
1 CAPSULE PO QD TABLET ORAL
Status: ACTIVE | COMMUNITY
Start: 2017-09-28

## 2024-06-14 RX ORDER — ESOMEPRAZOLE MAGNESIUM 40 MG/1
1 CAPSULE CAPSULE, DELAYED RELEASE ORAL ONCE A DAY
Qty: 90 CAPSULE | Refills: 4 | Status: ACTIVE | COMMUNITY

## 2024-06-14 RX ORDER — FAMOTIDINE 40 MG/1
1 TAB TABLET, FILM COATED ORAL ONCE A DAY
Qty: 30 TABLET | Refills: 11 | Status: ACTIVE | COMMUNITY
Start: 2021-12-29

## 2024-06-14 RX ORDER — ESOMEPRAZOLE MAGNESIUM 40 MG
TAKE 1 CAPSULE BY MOUTH TWICE A DAY CAPSULE,DELAYED RELEASE (ENTERIC COATED) ORAL
Status: ACTIVE | COMMUNITY
Start: 2020-01-15

## 2024-10-18 ENCOUNTER — OFFICE VISIT (OUTPATIENT)
Dept: URBAN - METROPOLITAN AREA CLINIC 27 | Facility: CLINIC | Age: 69
End: 2024-10-18
Payer: MEDICARE

## 2024-10-18 VITALS
DIASTOLIC BLOOD PRESSURE: 71 MMHG | SYSTOLIC BLOOD PRESSURE: 132 MMHG | WEIGHT: 220 LBS | HEIGHT: 64 IN | HEART RATE: 59 BPM | BODY MASS INDEX: 37.56 KG/M2

## 2024-10-18 DIAGNOSIS — K21.9 CHRONIC GERD: ICD-10-CM

## 2024-10-18 DIAGNOSIS — R05.3 CHRONIC COUGH: ICD-10-CM

## 2024-10-18 DIAGNOSIS — R13.19 ESOPHAGEAL DYSPHAGIA: ICD-10-CM

## 2024-10-18 PROCEDURE — 99214 OFFICE O/P EST MOD 30 MIN: CPT | Performed by: PHYSICIAN ASSISTANT

## 2024-10-18 RX ORDER — FAMOTIDINE 40 MG/1
1 TAB TABLET, FILM COATED ORAL ONCE A DAY
Qty: 30 TABLET | Refills: 11 | Status: ACTIVE | COMMUNITY
Start: 2021-12-29

## 2024-10-18 RX ORDER — LOSARTAN POTASSIUM 100 MG/1
TABLET, FILM COATED ORAL
Status: ACTIVE | COMMUNITY
Start: 2020-10-02

## 2024-10-18 RX ORDER — BISMUTH SUBSALICYLATE 525 MG/1
1 CAPSULE PO QD TABLET ORAL
Status: ACTIVE | COMMUNITY
Start: 2017-09-28

## 2024-10-18 RX ORDER — MONTELUKAST SODIUM 10 MG/1
TABLET, FILM COATED ORAL
Status: ACTIVE | COMMUNITY
Start: 2020-10-02

## 2024-10-18 RX ORDER — OMEPRAZOLE 40 MG/1
TAKE 1 CAPSULE PO BID CAPSULE, DELAYED RELEASE ORAL
Status: ACTIVE | COMMUNITY
Start: 2018-10-11

## 2024-10-18 RX ORDER — ESOMEPRAZOLE MAGNESIUM 40 MG/1
1 CAPSULE CAPSULE, DELAYED RELEASE ORAL ONCE A DAY
Qty: 90 CAPSULE | Refills: 4 | Status: ACTIVE | COMMUNITY

## 2024-10-18 RX ORDER — LABETALOL HYDROCHLORIDE 100 MG/1
1 TABLET TABLET ORAL TWICE A DAY
Status: ACTIVE | COMMUNITY

## 2024-10-18 RX ORDER — TIZANIDINE HYDROCHLORIDE 4 MG/1
TABLET ORAL
Status: ACTIVE | COMMUNITY
Start: 2017-09-28

## 2024-10-18 RX ORDER — BUPROPION HYDROCHLORIDE 200 MG/1
ON HOLD TABLET, FILM COATED ORAL
Status: ACTIVE | COMMUNITY
Start: 2015-04-02

## 2024-10-18 RX ORDER — FLUTICASONE PROPIONATE 50 UG/1
SPRAY, METERED NASAL
Status: ACTIVE | COMMUNITY
Start: 2020-10-02

## 2024-10-18 RX ORDER — MV,CALCIUM,MIN/IRON/FOLIC ACID 18-0.4-6MG
TABLET ORAL
Status: ACTIVE | COMMUNITY
Start: 2015-08-18

## 2024-10-18 RX ORDER — ESOMEPRAZOLE MAGNESIUM 40 MG
TAKE 1 CAPSULE BY MOUTH TWICE A DAY CAPSULE,DELAYED RELEASE (ENTERIC COATED) ORAL
Status: ACTIVE | COMMUNITY
Start: 2020-01-15

## 2024-10-18 NOTE — HPI-ZZZTODAY'S VISIT
Ms. Pinzon is a 68-year-old female patient of Dr. Erazo who presents for follow-up of reflux and abdominal pain.  She has had no further episodes of RUQ pain. Abd US showed gallstones/sludge and thickened GB wall; she was referred to general surgery, saw them and was told she did not need jak unless she became symptomatic again. Recently she has been having nighttime coughing. She had laryngoscopy with Dr. Burrell which showed moderate reflux changes and was advised to follow up with Dr. Erazo. She denies overt heartburn, water brash, dysphagia. She was taking Nexium QD but has been cutting back, is concerned about long term PPI use. She also uses nighttime Pepcid prn. She does not think either of these meds help with her coughing. . EGD March 2021, [facility]: Gastritis, biopsies unremarkable Colonoscopy March 2021, [facility]: 3 small polyps; recall 3 to 5 years Abdominal US December 2022:Numerous gallstones and/or sludge, thickened gallbladder wall (5 mm); consider HIDA Pelvic US December 2022:Thickened heterogeneous endometrium, consider sampling; possible exophytic left fibroid a

## 2024-10-21 ENCOUNTER — TELEPHONE ENCOUNTER (OUTPATIENT)
Dept: URBAN - METROPOLITAN AREA CLINIC 27 | Facility: CLINIC | Age: 69
End: 2024-10-21

## 2024-10-23 ENCOUNTER — OFFICE VISIT (OUTPATIENT)
Dept: URBAN - METROPOLITAN AREA SURGERY CENTER 7 | Facility: SURGERY CENTER | Age: 69
End: 2024-10-23

## 2024-10-23 ENCOUNTER — TELEPHONE ENCOUNTER (OUTPATIENT)
Dept: URBAN - METROPOLITAN AREA CLINIC 27 | Facility: CLINIC | Age: 69
End: 2024-10-23

## 2024-10-24 ENCOUNTER — TELEPHONE ENCOUNTER (OUTPATIENT)
Dept: URBAN - METROPOLITAN AREA CLINIC 27 | Facility: CLINIC | Age: 69
End: 2024-10-24

## 2024-11-11 ENCOUNTER — OFFICE VISIT (OUTPATIENT)
Dept: URBAN - METROPOLITAN AREA SURGERY CENTER 7 | Facility: SURGERY CENTER | Age: 69
End: 2024-11-11

## 2024-11-19 ENCOUNTER — TELEPHONE ENCOUNTER (OUTPATIENT)
Dept: URBAN - METROPOLITAN AREA CLINIC 27 | Facility: CLINIC | Age: 69
End: 2024-11-19

## 2024-11-19 ENCOUNTER — OFFICE VISIT (OUTPATIENT)
Dept: URBAN - METROPOLITAN AREA SURGERY CENTER 7 | Facility: SURGERY CENTER | Age: 69
End: 2024-11-19

## 2024-12-05 ENCOUNTER — TELEPHONE ENCOUNTER (OUTPATIENT)
Dept: URBAN - METROPOLITAN AREA CLINIC 27 | Facility: CLINIC | Age: 69
End: 2024-12-05

## 2024-12-10 ENCOUNTER — OFFICE VISIT (OUTPATIENT)
Dept: URBAN - METROPOLITAN AREA MEDICAL CENTER 8 | Facility: MEDICAL CENTER | Age: 69
End: 2024-12-10

## 2024-12-26 ENCOUNTER — OFFICE VISIT (OUTPATIENT)
Dept: URBAN - METROPOLITAN AREA CLINIC 27 | Facility: CLINIC | Age: 69
End: 2024-12-26

## 2025-01-07 ENCOUNTER — TELEPHONE ENCOUNTER (OUTPATIENT)
Dept: URBAN - METROPOLITAN AREA CLINIC 27 | Facility: CLINIC | Age: 70
End: 2025-01-07

## 2025-01-07 RX ORDER — FAMOTIDINE 40 MG/1
1 TAB TABLET, FILM COATED ORAL ONCE A DAY
Qty: 90 | Refills: 0
Start: 2021-12-29

## 2025-01-14 ENCOUNTER — OFFICE VISIT (OUTPATIENT)
Dept: URBAN - METROPOLITAN AREA MEDICAL CENTER 8 | Facility: MEDICAL CENTER | Age: 70
End: 2025-01-14

## 2025-01-14 RX ORDER — FAMOTIDINE 40 MG/1
1 TAB TABLET, FILM COATED ORAL ONCE A DAY
Qty: 90 | Refills: 0 | Status: ACTIVE | COMMUNITY
Start: 2021-12-29

## 2025-01-14 RX ORDER — FLUTICASONE PROPIONATE 50 UG/1
SPRAY, METERED NASAL
Status: ACTIVE | COMMUNITY
Start: 2020-10-02

## 2025-01-14 RX ORDER — BISMUTH SUBSALICYLATE 525 MG/1
1 CAPSULE PO QD TABLET ORAL
Status: ACTIVE | COMMUNITY
Start: 2017-09-28

## 2025-01-14 RX ORDER — LOSARTAN POTASSIUM 100 MG/1
TABLET, FILM COATED ORAL
Status: ACTIVE | COMMUNITY
Start: 2020-10-02

## 2025-01-14 RX ORDER — MV,CALCIUM,MIN/IRON/FOLIC ACID 18-0.4-6MG
TABLET ORAL
Status: ACTIVE | COMMUNITY
Start: 2015-08-18

## 2025-01-14 RX ORDER — MONTELUKAST SODIUM 10 MG/1
TABLET, FILM COATED ORAL
Status: ACTIVE | COMMUNITY
Start: 2020-10-02

## 2025-01-15 ENCOUNTER — TELEPHONE ENCOUNTER (OUTPATIENT)
Dept: URBAN - METROPOLITAN AREA CLINIC 27 | Facility: CLINIC | Age: 70
End: 2025-01-15

## 2025-01-16 ENCOUNTER — TELEPHONE ENCOUNTER (OUTPATIENT)
Dept: URBAN - METROPOLITAN AREA CLINIC 27 | Facility: CLINIC | Age: 70
End: 2025-01-16

## 2025-01-17 ENCOUNTER — TELEPHONE ENCOUNTER (OUTPATIENT)
Dept: URBAN - METROPOLITAN AREA CLINIC 27 | Facility: CLINIC | Age: 70
End: 2025-01-17

## 2025-01-28 ENCOUNTER — TELEPHONE ENCOUNTER (OUTPATIENT)
Dept: URBAN - METROPOLITAN AREA CLINIC 27 | Facility: CLINIC | Age: 70
End: 2025-01-28

## 2025-01-29 ENCOUNTER — TELEPHONE ENCOUNTER (OUTPATIENT)
Dept: URBAN - METROPOLITAN AREA CLINIC 27 | Facility: CLINIC | Age: 70
End: 2025-01-29

## 2025-01-30 ENCOUNTER — OFFICE VISIT (OUTPATIENT)
Dept: URBAN - METROPOLITAN AREA CLINIC 27 | Facility: CLINIC | Age: 70
End: 2025-01-30
Payer: MEDICARE

## 2025-01-30 VITALS
WEIGHT: 224 LBS | BODY MASS INDEX: 38.24 KG/M2 | HEIGHT: 64 IN | DIASTOLIC BLOOD PRESSURE: 78 MMHG | SYSTOLIC BLOOD PRESSURE: 117 MMHG | HEART RATE: 69 BPM

## 2025-01-30 DIAGNOSIS — K21.9 GERD (GASTROESOPHAGEAL REFLUX DISEASE): ICD-10-CM

## 2025-01-30 DIAGNOSIS — Z79.82 LONG TERM (CURRENT) USE OF ASPIRIN: ICD-10-CM

## 2025-01-30 DIAGNOSIS — E66.9 OBESITY, UNSPECIFIED: ICD-10-CM

## 2025-01-30 DIAGNOSIS — K80.20 GALLSTONES: ICD-10-CM

## 2025-01-30 DIAGNOSIS — Z86.010 PERSONAL HISTORY OF COLONIC POLYPS: ICD-10-CM

## 2025-01-30 PROCEDURE — 99213 OFFICE O/P EST LOW 20 MIN: CPT | Performed by: INTERNAL MEDICINE

## 2025-01-30 RX ORDER — LOSARTAN POTASSIUM 100 MG/1
TABLET, FILM COATED ORAL
Status: ACTIVE | COMMUNITY
Start: 2020-10-02

## 2025-01-30 RX ORDER — BISMUTH SUBSALICYLATE 525 MG/1
1 CAPSULE PO QD TABLET ORAL
Status: ACTIVE | COMMUNITY
Start: 2017-09-28

## 2025-01-30 RX ORDER — MV,CALCIUM,MIN/IRON/FOLIC ACID 18-0.4-6MG
TABLET ORAL
Status: ACTIVE | COMMUNITY
Start: 2015-08-18

## 2025-01-30 RX ORDER — FLUTICASONE PROPIONATE 50 UG/1
SPRAY, METERED NASAL
Status: ACTIVE | COMMUNITY
Start: 2020-10-02

## 2025-01-30 RX ORDER — MONTELUKAST SODIUM 10 MG/1
TABLET, FILM COATED ORAL
Status: ACTIVE | COMMUNITY
Start: 2020-10-02

## 2025-01-30 RX ORDER — FAMOTIDINE 40 MG/1
1 TAB TABLET, FILM COATED ORAL ONCE A DAY
Qty: 90 | Refills: 0 | Status: ACTIVE | COMMUNITY
Start: 2021-12-29

## 2025-01-30 NOTE — HPI-TODAY'S VISIT:
This is a 69-year-old female seen in follow-up consultation for her reflux symptoms.  Recent pH study was positive for distal esophageal acid exposure.  She could not tolerate placement of the manometry catheter.  Overall she states she is back to her baseline.  She did have some discomfort when the sensor was placed but that has resolved.  She is not complaining of a cough today.